# Patient Record
Sex: FEMALE | URBAN - METROPOLITAN AREA
[De-identification: names, ages, dates, MRNs, and addresses within clinical notes are randomized per-mention and may not be internally consistent; named-entity substitution may affect disease eponyms.]

---

## 2018-12-01 ENCOUNTER — OUTPATIENT (OUTPATIENT)
Dept: OUTPATIENT SERVICES | Facility: HOSPITAL | Age: 33
LOS: 1 days | End: 2018-12-01
Payer: MEDICAID

## 2018-12-01 PROCEDURE — G9001: CPT

## 2018-12-16 ENCOUNTER — EMERGENCY (EMERGENCY)
Facility: HOSPITAL | Age: 33
LOS: 1 days | Discharge: HOME | End: 2018-12-16
Attending: EMERGENCY MEDICINE
Payer: MEDICAID

## 2018-12-16 VITALS
TEMPERATURE: 98 F | RESPIRATION RATE: 16 BRPM | SYSTOLIC BLOOD PRESSURE: 158 MMHG | HEART RATE: 100 BPM | DIASTOLIC BLOOD PRESSURE: 100 MMHG | OXYGEN SATURATION: 100 % | HEIGHT: 63 IN | WEIGHT: 149.91 LBS

## 2018-12-16 PROCEDURE — 99283 EMERGENCY DEPT VISIT LOW MDM: CPT | Mod: 25

## 2018-12-16 PROCEDURE — 82962 GLUCOSE BLOOD TEST: CPT

## 2018-12-16 PROCEDURE — 10160 PNXR ASPIR ABSC HMTMA BULLA: CPT

## 2018-12-16 RX ORDER — IBUPROFEN 200 MG
600 TABLET ORAL ONCE
Qty: 0 | Refills: 0 | Status: COMPLETED | OUTPATIENT
Start: 2018-12-16 | End: 2018-12-16

## 2018-12-16 RX ORDER — MUPIROCIN 20 MG/G
1 OINTMENT TOPICAL
Qty: 1 | Refills: 0 | OUTPATIENT
Start: 2018-12-16 | End: 2018-12-25

## 2018-12-16 RX ADMIN — Medication 100 MILLIGRAM(S): at 04:06

## 2018-12-16 RX ADMIN — Medication 600 MILLIGRAM(S): at 04:06

## 2018-12-16 RX ADMIN — Medication 600 MILLIGRAM(S): at 04:16

## 2018-12-16 NOTE — ED ADULT NURSE NOTE - NSSISCREENINGQ2_ED_A_ED
NN/OSN Encounter Summary       NN/OSN Assessment    NN/OSN Assessments:  Patient reports BG are usually in the 200s, sometimes 300, but he is also having some lows in the 60-70s. He occasionally treats with 3 glucose tablets as directed, but more often continues to overtreat with inappropriate foods, like candy bars. He reports CGMS has been ordered by his doctor, but there are no plans in place for training.   Hamstring injury remains somewhat painful, but tramadol he takes for knee pain helps. PT has been recommended. He states he is supposed to return to work next Tuesday (2-6-18), but doubts if he will be ready/able.       NN/OSN Interventions                Patient Education:  Yes   Recipients of Education:  Patient   Education Methods used this encounter:  Verbal   Disease Process   Reinforce Care Plan   Interventions Narrative:  Discussed need for training for CGMS; patient agrees to request referral to diabetes educator from PCP.  Will attempt to connect patient with area Dexcom /rep.   Reviewed advantages of treating lows with glucose tablets or other 15 gm fast carb, like 1/2 cup juice.   Encouraged to move his leg to prevent stiffness, promote healing , decrease bruising, and to participate in PT as soon as available.      Garrett ESQUIVELN RN  Nurse Navigator  [facility]  Phone: [phone]    
No

## 2018-12-16 NOTE — ED ADULT TRIAGE NOTE - CHIEF COMPLAINT QUOTE
pt c/o painful growth on the top of her head and headache on left side of her head x 3 days, denies nausea, dizziness

## 2018-12-16 NOTE — ED PROVIDER NOTE - OBJECTIVE STATEMENT
H/o DMII  p/w painful growth to top of head x 3 days. Relates not taking her DM pills for several days, does not recall name. No f/c, trauma or drainage. No meds taken. No h/o abscess.

## 2018-12-16 NOTE — ED ADULT NURSE NOTE - NSSISCREENINGQ5_ED_A_ED
PATIENTS ONCOLOGY RECORD LOCATED IN [facility]      Subjective     Name:  MELVIN DUNCAN JR     Date:  2018  Address:  [address] [place] [address] [place] [facility]  Home: [phone]  :  1952 AGE:  66 y.o.        RECORDS OBTAINED:  Patients Oncology Record is located in [facility]   No

## 2018-12-16 NOTE — ED ADULT NURSE NOTE - NSIMPLEMENTINTERV_GEN_ALL_ED
Implemented All Universal Safety Interventions:  Reserve to call system. Call bell, personal items and telephone within reach. Instruct patient to call for assistance. Room bathroom lighting operational. Non-slip footwear when patient is off stretcher. Physically safe environment: no spills, clutter or unnecessary equipment. Stretcher in lowest position, wheels locked, appropriate side rails in place.

## 2018-12-16 NOTE — ED ADULT NURSE REASSESSMENT NOTE - NS ED NURSE REASSESS COMMENT FT1
Dr Larsen informed pt's fingerstick 315mg/dl. Dr Larsen  spoke with pt explained why they should stay for iv hydration, pt insisted she wanted to go and stated she would drink plenty of fluids and take her metformin.

## 2018-12-16 NOTE — ED PROVIDER NOTE - MEDICAL DECISION MAKING DETAILS
infected sebaceous cyst to midparietal scalp-will drain cysy, apply topical/po abx, check fingerstick glucose

## 2018-12-16 NOTE — ED PROVIDER NOTE - PROGRESS NOTE DETAILS
Pt and  refused IV fluids, labwork to help tx elevated glucose, r/b/a explained. Wanted to resume pills at home, drink PO liquids to lower blood sugar.

## 2018-12-17 DIAGNOSIS — Z71.89 OTHER SPECIFIED COUNSELING: ICD-10-CM

## 2019-11-18 NOTE — ED PROCEDURE NOTE - CPROC ED INFORMED CONSENT1
Order done  Please ask pt to use it as needed only  Benefits, risks, and possible complications of procedure explained to patient/caregiver who verbalized understanding and gave verbal consent.